# Patient Record
Sex: FEMALE | URBAN - METROPOLITAN AREA
[De-identification: names, ages, dates, MRNs, and addresses within clinical notes are randomized per-mention and may not be internally consistent; named-entity substitution may affect disease eponyms.]

---

## 2020-01-23 ENCOUNTER — NURSE ONLY (OUTPATIENT)
Dept: ONCOLOGY | Age: 75
End: 2020-01-23

## 2020-01-23 NOTE — PROGRESS NOTES
diagnosed at younger ages (under age 46y) and occur in multiple generations of a family. Multiple individuals with the same type of cancer (example: breast) or uncommon cancers (example: ovarian, pancreatic, male breast cancer) are also features of hereditary cancers. We discussed that Ms. Colin's personal history is not highly concerning for a hereditary predisposition to cancer given that her breast cancer was diagnosed over age 49. However, she does have a strong family history of various cancers including breast, ovarian, colon and prostate cancers. In summary, Ms. Colin meets the [employer] ([employer]) guidelines for genetic testing based on her personal history of breast cancer and having at least 3 close relatives with breast, metastatic prostate, and ovarian cancers. DISCUSSION  We discussed that the BRCA1/2 genes are the most common genes associated with hereditary breast and ovarian cancer. We also discussed that genetic testing is available for multiple other genes related to hereditary cancer. Some of these genes are known to carry a significant increased risk for several cancers including colon, breast, uterine, ovarian, stomach, and pancreatic cancer, while some of these genes are believed to have a moderate increased risk for breast and other cancers. We discussed the possibility of finding a mutation in genes with limited information to guide medical management, as well we as the possibility of identifying variants of uncertain significance (VUS). We discussed the risks, benefits, and limitations of genetic testing. Possible test results were discussed as well as potential screening and prevention strategies. Specifically, we discussed increased breast cancer surveillance by mammogram and breast MRI as well as the option of prophylactic mastectomy. Lastly, we discussed that the results of Ms. Colin's genetic testing

## 2020-02-06 ENCOUNTER — TELEPHONE (OUTPATIENT)
Dept: ONCOLOGY | Age: 75
End: 2020-02-06

## 2020-02-06 NOTE — TELEPHONE ENCOUNTER
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 3) We encourage Ms. Colin to contact us every 1-2 years to determine if there are any new genetic testing or research options available. 4) We encourage Ms. Colin to contact us with updates to her personal and/or familys cancer history as this information may alter our assessment and/or recommendations. The [address] [place] [address] would be glad to offer our assistance should you have any questions or concerns about this information. Please feel free to contact us at [phone]. Bryanna Pritchett.  Jewell Machuca MS, [facility]   Licensed Genetic Counselor         CC:  Ms. Filipe Houston MD